# Patient Record
Sex: MALE | Race: WHITE | URBAN - METROPOLITAN AREA
[De-identification: names, ages, dates, MRNs, and addresses within clinical notes are randomized per-mention and may not be internally consistent; named-entity substitution may affect disease eponyms.]

---

## 2019-01-04 ENCOUNTER — OFFICE VISIT (OUTPATIENT)
Dept: URBAN - METROPOLITAN AREA CLINIC 17 | Facility: CLINIC | Age: 84
End: 2019-01-04
Payer: MEDICARE

## 2019-01-04 DIAGNOSIS — H43.813 VITREOUS DEGENERATION, BILATERAL: ICD-10-CM

## 2019-01-04 DIAGNOSIS — H35.373 PUCKERING OF MACULA, BILATERAL: Primary | ICD-10-CM

## 2019-01-04 DIAGNOSIS — H11.153 PINGUECULA, BILATERAL: ICD-10-CM

## 2019-01-04 PROCEDURE — 92014 COMPRE OPH EXAM EST PT 1/>: CPT | Performed by: OPTOMETRIST

## 2019-01-04 PROCEDURE — 92134 CPTRZ OPH DX IMG PST SGM RTA: CPT | Performed by: OPTOMETRIST

## 2019-01-04 ASSESSMENT — INTRAOCULAR PRESSURE
OS: 13
OD: 11

## 2019-01-04 ASSESSMENT — KERATOMETRY
OD: 42.88
OS: 42.38

## 2019-01-04 NOTE — IMPRESSION/PLAN
Impression: Pinguecula, bilateral: H11.153. Plan: Will continue to observe condition and or symptoms.

## 2019-01-04 NOTE — IMPRESSION/PLAN
Impression: Diagnosis: Puckering of macula, bilateral. Code: V74.981. Conditions: established, stable. Vision: vision not affected. Plan: Discussed diagnosis in detail with patient. Exam and OCT shows stable mild ERM OU not affecting the vision. No treatment is required at this time. Will continue to observe condition and or symptoms.

## 2019-01-04 NOTE — IMPRESSION/PLAN
Impression: Nexdtve age-related mclr degn, bilateral, early dry stage: H35.3131. OU. Condition: established, stable. Vision: vision not affected. Plan: Discussed diagnosis in detail with patient. No treatment is required at this time based on exam and OCT. Will reassess condition in 1 yr. OCT shows no IRF or SRF - stable OU. Continue taking the eye vitamins - AREDS 2 formula.

## 2019-02-20 ENCOUNTER — OFFICE VISIT (OUTPATIENT)
Dept: URBAN - METROPOLITAN AREA CLINIC 17 | Facility: CLINIC | Age: 84
End: 2019-02-20
Payer: MEDICARE

## 2019-02-20 PROCEDURE — 92134 CPTRZ OPH DX IMG PST SGM RTA: CPT | Performed by: OPHTHALMOLOGY

## 2019-02-20 PROCEDURE — 92014 COMPRE OPH EXAM EST PT 1/>: CPT | Performed by: OPHTHALMOLOGY

## 2019-02-20 ASSESSMENT — INTRAOCULAR PRESSURE
OD: 11
OS: 9

## 2019-02-20 NOTE — IMPRESSION/PLAN
Impression: Nexdtve age-related mclr degn, bilateral, early dry stage: H35.3131. OU. Condition: stable. Vision: vision not affected. Hx of Kenalog injection OD 07/31/2013 in [place]. Plan: Discussed diagnosis in detail with patient. No treatment is required at this time based on exam and OCT. Recommend observation for now. Recommend to continue eye care with Optometrist. Discussed and recommend eye vitamins - AREDS 2 formula.

## 2019-02-20 NOTE — IMPRESSION/PLAN
Impression: Puckering of macula, bilateral. Code: L91.770. Conditions: established, stable. Vision: vision not affected. Plan: Discussed diagnosis in detail with patient. Exam and OCT shows stable mild ERM OU not affecting the vision. No treatment is required at this time. Will continue to observe condition and or symptoms. Recommend to continue eye care with Optometrist and follow - up in 6 mos w/Dr. Yokasta Wilson or Dr Geraldine Kearney, patient states he will be returning home in [place], recommend to f/u back home. OCT shows RPE changes centrally with no significant ERM OU.

## 2019-10-23 ENCOUNTER — OFFICE VISIT (OUTPATIENT)
Dept: URBAN - METROPOLITAN AREA CLINIC 17 | Facility: CLINIC | Age: 84
End: 2019-10-23
Payer: MEDICARE

## 2019-10-23 DIAGNOSIS — Z96.1 PRESENCE OF INTRAOCULAR LENS: Primary | ICD-10-CM

## 2019-10-23 DIAGNOSIS — H35.3131 NEXDTVE AGE-RELATED MCLR DEGN, BILATERAL, EARLY DRY STAGE: ICD-10-CM

## 2019-10-23 PROCEDURE — 92014 COMPRE OPH EXAM EST PT 1/>: CPT | Performed by: OPTOMETRIST

## 2019-10-23 PROCEDURE — 92134 CPTRZ OPH DX IMG PST SGM RTA: CPT | Performed by: OPTOMETRIST

## 2019-10-23 ASSESSMENT — INTRAOCULAR PRESSURE
OS: 15
OD: 13

## 2019-10-23 NOTE — IMPRESSION/PLAN
Impression: Nexdtve age-related mclr degn, bilateral, early dry stage: H35.3131. OU. Condition: stable. Vision: vision not affected. Hx of Kenalog injection OD 07/31/2013 in [place]. Plan: Discussed diagnosis in detail with patient. No treatment is required at this time based on exam and OCT. Discussed and recommend eye vitamins - AREDS 2 formula.

## 2020-12-11 ENCOUNTER — OFFICE VISIT (OUTPATIENT)
Dept: URBAN - METROPOLITAN AREA CLINIC 17 | Facility: CLINIC | Age: 85
End: 2020-12-11
Payer: MEDICARE

## 2020-12-11 DIAGNOSIS — H35.3110 NONEXUDATIVE AGE-RELATED MACULAR DEGENERATION OF RIGHT EYE: ICD-10-CM

## 2020-12-11 DIAGNOSIS — H35.372 PUCKERING OF MACULA, LEFT EYE: ICD-10-CM

## 2020-12-11 DIAGNOSIS — H35.3220 EXUDATIVE AGE-RELATED MACULAR DEGENERATION OF LEFT EYE: Primary | ICD-10-CM

## 2020-12-11 PROCEDURE — 92134 CPTRZ OPH DX IMG PST SGM RTA: CPT | Performed by: OPTOMETRIST

## 2020-12-11 PROCEDURE — 92014 COMPRE OPH EXAM EST PT 1/>: CPT | Performed by: OPTOMETRIST

## 2020-12-11 ASSESSMENT — INTRAOCULAR PRESSURE
OS: 14
OD: 12

## 2020-12-11 NOTE — IMPRESSION/PLAN
Impression: Exudative age-related macular degeneration of left eye: H35.2526. Plan: Discussed diagnosis in detail with patient. Reviewed MAC OCT. New fluid. Consult recommended [[facility]
Impression: Nonexudative age-related macular degeneration of right eye: H35.3110. Plan: Discussed diagnosis & treatment options in detail with patient. Discussed risks of progression. Use of OTC AREDS2 vitamins has shown to slow the progression of AMD. If any new distortion noted, pt aware to call office immediately. Will continue to monitor with yearly DE and Mac OCT.
Impression: Presence of intraocular lens: Z96.1. Bilateral. Plan: IOL(s) positioned well.  Monitor with yearly dilated eye exam.
Impression: Puckering of macula, left eye: H35.372. Plan: No treatment is required. Will continue to monitor.
Impression: Vitreous degeneration, bilateral: H43.813. Plan: There is no evidence of retinal pathology. Recommend the patient return to office yearly for follow up.
no

## 2020-12-17 ENCOUNTER — OFFICE VISIT (OUTPATIENT)
Dept: URBAN - METROPOLITAN AREA CLINIC 17 | Facility: CLINIC | Age: 85
End: 2020-12-17
Payer: MEDICARE

## 2020-12-17 DIAGNOSIS — H43.812 VITREOUS DEGENERATION, LEFT EYE: ICD-10-CM

## 2020-12-17 PROCEDURE — 92014 COMPRE OPH EXAM EST PT 1/>: CPT | Performed by: OPHTHALMOLOGY

## 2020-12-17 PROCEDURE — 67028 INJECTION EYE DRUG: CPT | Performed by: OPHTHALMOLOGY

## 2020-12-17 PROCEDURE — 92134 CPTRZ OPH DX IMG PST SGM RTA: CPT | Performed by: OPHTHALMOLOGY

## 2020-12-17 ASSESSMENT — INTRAOCULAR PRESSURE
OS: 11
OD: 11

## 2020-12-17 NOTE — IMPRESSION/PLAN
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 Right. Condition: new problem addtl w/u needed. Vision: vision affected. Plan: Discussed diagnosis in detail with patient. Discussed risks of progression. Based on today's exam, diagnostic studies and review of records, recommend Intravitreal Injection Treatment RIGHT EYE with AVASTIN to help reduce the fluid in order to prevent a further reduction in vision. An examination that was significantly and separately identifiable from the procedure was performed today. Discussed the risks and benefits of tx. All questions answered. Patient elects to proceed with recommendation. OCT shows significant Cystic edema and Optos OD shows mild thickening w/heme.

## 2020-12-17 NOTE — IMPRESSION/PLAN
Impression: Vitreous degeneration, left eye: H43.812. Bilateral. Condition: new prob, no addtl w/u needed. Plan: Posterior vitreous detachment accounts for the patient's complaints. There is no evidence of retinal pathology. All signs and risks of retinal detachment and tears were discussed in detail. Patient instructed to call office immediately if any symptoms noted.

## 2020-12-17 NOTE — IMPRESSION/PLAN
Impression: Nexdtve age-rel mclr degn, l eye, adv atrpc with sbfvl invl: H33.6093. Left. Condition: new prob, no addtl w/u needed. Vision: vision affected. Plan: Discussed diagnosis in detail with patient. Exam OS shows no IRF or SRF although Optos OS shows ? subfoveal thickening, no obvious SRN and OCT appears stable OS. No treatment is required at this time. Recommend observation for now. Will reassess condition in 4 - 6 wks.  OCT shows no IRF or SRF - stable

## 2021-01-14 ENCOUNTER — OFFICE VISIT (OUTPATIENT)
Dept: URBAN - METROPOLITAN AREA CLINIC 17 | Facility: CLINIC | Age: 86
End: 2021-01-14
Payer: MEDICARE

## 2021-01-14 PROCEDURE — 92134 CPTRZ OPH DX IMG PST SGM RTA: CPT | Performed by: OPHTHALMOLOGY

## 2021-01-14 PROCEDURE — 67028 INJECTION EYE DRUG: CPT | Performed by: OPHTHALMOLOGY

## 2021-01-14 ASSESSMENT — INTRAOCULAR PRESSURE
OS: 12
OD: 14

## 2021-01-14 NOTE — IMPRESSION/PLAN
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 Right. Condition: improving. Vision: vision affected. s/p AV OD #1  12/17/2020 Plan: Due to Coronavirus COVID-19 pandemic and National Emergency, deferred Slit Lamp examination. Findings are based on OCT and Optos. OCT and Optos OD shows a decrease in fluid. Based on findings recommend to continue with Intravitreal Injection Treatment RIGHT EYE with AVASTIN to further reduce the fluid and prevent a further reduction in vision. Discussed the risks and benefits of tx. All questions answered. Patient elects to proceed with recommendation.

## 2021-01-14 NOTE — IMPRESSION/PLAN
Impression: Nexdtve age-rel mclr degn, l eye, adv atrpc with sbfvl invl: H35.6023. Left. Condition: stable. Vision: vision affected. Plan: Due to Coronavirus COVID-19 pandemic and National Emergency, deferred Slit Lamp examination. Findings are based on OCT and Optos. OCT and Optos shows no IRF or SRF OD. No treatment is require at this time. Recommend a retina follow - up in 4 - 6 wks. Discussed and recommend eye vitamins - AREDS 2, educational material provided.

## 2021-02-11 ENCOUNTER — OFFICE VISIT (OUTPATIENT)
Dept: URBAN - METROPOLITAN AREA CLINIC 17 | Facility: CLINIC | Age: 86
End: 2021-02-11
Payer: MEDICARE

## 2021-02-11 PROCEDURE — 67028 INJECTION EYE DRUG: CPT | Performed by: OPHTHALMOLOGY

## 2021-02-11 PROCEDURE — 92134 CPTRZ OPH DX IMG PST SGM RTA: CPT | Performed by: OPHTHALMOLOGY

## 2021-02-11 ASSESSMENT — INTRAOCULAR PRESSURE
OS: 13
OD: 13

## 2021-02-11 NOTE — IMPRESSION/PLAN
Impression: Nexdtve age-rel mclr degn, l eye, adv atrpc with sbfvl invl: H35.4244. Left. Condition: stable. Vision: vision affected. Plan: Due to Coronavirus COVID-19 pandemic and National Emergency, deferred Slit Lamp examination. Findings are based on OCT and Optos. OCT and Optos shows no IRF or SRF OD. No treatment is require at this time. Recommend a retina follow - up in 4 - 6 wks. Discussed and recommend eye vitamins - AREDS 2, educational material provided.

## 2021-02-11 NOTE — IMPRESSION/PLAN
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 Right. Condition: improving. Vision: vision affected. s/p AV OD 01/14/2021, AV OD #1  12/17/2020 Plan: Due to Coronavirus COVID-19 pandemic and National Emergency, deferred Slit Lamp examination. Findings are based on OCT and Optos. OCT and Optos OD shows a mild fluid. Based on findings recommend to continue with Intravitreal Injection Treatment RIGHT EYE with AVASTIN to further reduce the fluid and prevent a further reduction in vision. Discussed the risks and benefits of tx. All questions answered. Patient elects to proceed with recommendation.

## 2021-03-17 ENCOUNTER — OFFICE VISIT (OUTPATIENT)
Dept: URBAN - METROPOLITAN AREA CLINIC 17 | Facility: CLINIC | Age: 86
End: 2021-03-17
Payer: MEDICARE

## 2021-03-17 PROCEDURE — 67028 INJECTION EYE DRUG: CPT | Performed by: OPHTHALMOLOGY

## 2021-03-17 PROCEDURE — 92134 CPTRZ OPH DX IMG PST SGM RTA: CPT | Performed by: OPHTHALMOLOGY

## 2021-03-17 ASSESSMENT — INTRAOCULAR PRESSURE
OS: 13
OD: 11

## 2021-03-17 NOTE — IMPRESSION/PLAN
Impression: Nexdtve age-rel mclr degn, l eye, adv atrpc with sbfvl invl: H35.0521. Left. Condition: stable. Vision: vision affected. Plan: Due to Coronavirus COVID-19 pandemic and National Emergency, deferred Slit Lamp examination. Findings are based on OCT and Optos. OCT and Optos shows the macula is stable, no IRF or SRF OS. No treatment is required at this time. Recommend a retina follow - up in 4 - 6 wks. Advised patient to continue using AREDS-2 PO BID.

## 2021-03-17 NOTE — IMPRESSION/PLAN
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 Right. Condition: improving. Vision: vision affected. s/p AV OD 2/11/2021, AV OD 01/14/2021, AV OD #1  12/17/2020 Plan: Due to Coronavirus COVID-19 pandemic and National Emergency, deferred Slit Lamp examination. Findings are based on OCT and Optos. OCT OD shows decreased swelling and Optos OD shows mild fluid. Based on findings recommend to continue with Intravitreal Injection Treatment RIGHT EYE with AVASTIN to further reduce the fluid and prevent a further reduction in vision. Discussed the risks and benefits of tx. All questions answered. Patient elects to proceed with recommendation. Patient will be leaving to [facility] on 3/25/21 and will need to find a retina provider in the area for follow-up care. Provided the patient with the information for Dr. Lamberto Quiles in [place], [facility] to establish care.

## 2021-11-18 ENCOUNTER — OFFICE VISIT (OUTPATIENT)
Dept: URBAN - METROPOLITAN AREA CLINIC 17 | Facility: CLINIC | Age: 86
End: 2021-11-18
Payer: MEDICARE

## 2021-11-18 PROCEDURE — 92134 CPTRZ OPH DX IMG PST SGM RTA: CPT | Performed by: OPHTHALMOLOGY

## 2021-11-18 PROCEDURE — 99213 OFFICE O/P EST LOW 20 MIN: CPT | Performed by: OPHTHALMOLOGY

## 2021-11-18 PROCEDURE — 67028 INJECTION EYE DRUG: CPT | Performed by: OPHTHALMOLOGY

## 2021-11-18 ASSESSMENT — INTRAOCULAR PRESSURE
OS: 10
OD: 8

## 2021-11-18 NOTE — IMPRESSION/PLAN
Impression: Nexdtve age-rel mclr degn, l eye, adv atrpc with sbfvl invl: H30.6820. Left. Condition: stable. Vision: vision affected. Plan: Discussed diagnosis in detail with patient. Reviewed records from [place]. Exam shows the macula is stable OS. No treatment is required at this time based on exam and diagnostic test. Recommend observation for now. Will reassess condition in 6 wks. OCT shows the macula is stable, no IRF or SRF OS. Recommend a retina follow - up in 4 - 6 wks. Advised patient to continue using the eye vitamins AREDS-2 formula.

## 2021-11-18 NOTE — IMPRESSION/PLAN
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 Right. Condition: unstable. Vision: vision affected. s/p AV OD 2/11/2021, AV OD 01/14/2021, AV OD #1  12/17/2020
s/p multiple ANIYAH tx's, last tx OD 9/28/2021 in [facility] 119: Discussed diagnosis in detail with patient. Discussed risks of progression. Based on today's exam, diagnostic study and review of records from [place].  recommend to continue with Intravitreal Injection Treatment RIGHT EYE with AVASTIN  to help reduce the fluid in order to prevent a further reduction in vision. Discussed the risks and benefits of tx. All questions answered. Patient elects to proceed with recommendation. OCT OD shows minimal edema / leakage.

## 2021-12-28 ENCOUNTER — OFFICE VISIT (OUTPATIENT)
Dept: URBAN - METROPOLITAN AREA CLINIC 17 | Facility: CLINIC | Age: 86
End: 2021-12-28
Payer: MEDICARE

## 2021-12-28 DIAGNOSIS — H35.3124 NEXDTVE AGE-REL MCLR DEGN, L EYE, ADV ATRPC WITH SBFVL INVL: ICD-10-CM

## 2021-12-28 DIAGNOSIS — H35.3211 EXDTVE AGE-REL MCLR DEGN, RIGHT EYE, WITH ACTV CHRDL NEOVAS: Primary | ICD-10-CM

## 2021-12-28 PROCEDURE — 99213 OFFICE O/P EST LOW 20 MIN: CPT | Performed by: OPHTHALMOLOGY

## 2021-12-28 PROCEDURE — 92134 CPTRZ OPH DX IMG PST SGM RTA: CPT | Performed by: OPHTHALMOLOGY

## 2021-12-28 ASSESSMENT — INTRAOCULAR PRESSURE
OD: 8
OS: 8

## 2021-12-28 NOTE — IMPRESSION/PLAN
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 Right. Condition: improving. Vision: vision affected. s/p AV OD 11/18/2021, AV OD 3/17/2021, AV OD 2/11/2021, AV OD 01/14/2021, AV OD #1  12/17/2020
s/p multiple ANIYAH tx's, last tx OD 9/28/2021 in [address] [place] [address] 289: Discussed diagnosis in detail with patient. Discussed risks of progression. Based on today's exam, diagnostic study and review of records recommend to continue with Intravitreal Injection Treatment RIGHT EYE with AVASTIN  to help reduce the fluid in order to prevent a further reduction in vision. Discussed the risks and benefits of tx. All questions answered. Patient elects to proceed with recommendation. OCT and Optos OD shows minimal fluid.

## 2022-01-19 ENCOUNTER — PROCEDURE (OUTPATIENT)
Dept: URBAN - METROPOLITAN AREA CLINIC 17 | Facility: CLINIC | Age: 87
End: 2022-01-19
Payer: MEDICARE

## 2022-01-19 PROCEDURE — 67028 INJECTION EYE DRUG: CPT | Performed by: OPHTHALMOLOGY

## 2022-03-10 ENCOUNTER — OFFICE VISIT (OUTPATIENT)
Dept: URBAN - METROPOLITAN AREA CLINIC 17 | Facility: CLINIC | Age: 87
End: 2022-03-10
Payer: MEDICARE

## 2022-03-10 PROCEDURE — 67028 INJECTION EYE DRUG: CPT | Performed by: OPHTHALMOLOGY

## 2022-03-10 PROCEDURE — 92134 CPTRZ OPH DX IMG PST SGM RTA: CPT | Performed by: OPHTHALMOLOGY

## 2022-03-10 ASSESSMENT — INTRAOCULAR PRESSURE
OD: 13
OS: 13

## 2022-03-10 NOTE — IMPRESSION/PLAN
Impression: Nexdtve age-rel mclr degn, l eye, adv atrpc with sbfvl invl: H31.1980. Left. Condition: stable. Vision: vision affected. Plan: Discussed diagnosis in detail with patient. Exam shows the macula is stable OS. No treatment is required at this time based on exam and diagnostic test. Recommend observation for now. Patient is returning back home, advise to follow -up in 4 - 6 wks in [place]. Recommend to continue using the eye vitamins AREDS-2 formula. OCT and Optos shows the macula is stable, no IRF or SRF.

## 2022-03-10 NOTE — IMPRESSION/PLAN
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 Right. Condition: improving. Vision: vision affected. s/p AV OD 1/19/22, AV OD 11/18/2021, AV OD 3/17/2021 w/Dr Espino
s/p multiple ANIYAH tx's, last tx OD 9/28/2021 in [address] [place] [address] 289: Discussed diagnosis in detail with patient. Discussed risks of progression. Based on today's exam, diagnostic study and review of records recommend to continue with Intravitreal Injection Treatment RIGHT EYE with AVASTIN  to help reduce the fluid in order to prevent a further reduction in vision. Discussed the risks and benefits of tx. All questions answered. Patient elects to proceed with recommendation. OCT OD shows a moderate decrease in CMT and Optos OD shows decrease in heme.

## 2022-10-20 ENCOUNTER — OFFICE VISIT (OUTPATIENT)
Dept: URBAN - METROPOLITAN AREA CLINIC 17 | Facility: CLINIC | Age: 87
End: 2022-10-20
Payer: MEDICARE

## 2022-10-20 DIAGNOSIS — H35.3211 EXDTVE AGE-REL MCLR DEGN, RIGHT EYE, WITH ACTV CHRDL NEOVAS: Primary | ICD-10-CM

## 2022-10-20 DIAGNOSIS — H35.3124 NEXDTVE AGE-REL MCLR DEGN, L EYE, ADV ATRPC WITH SBFVL INVL: ICD-10-CM

## 2022-10-20 PROCEDURE — 92134 CPTRZ OPH DX IMG PST SGM RTA: CPT | Performed by: OPHTHALMOLOGY

## 2022-10-20 PROCEDURE — 99213 OFFICE O/P EST LOW 20 MIN: CPT | Performed by: OPHTHALMOLOGY

## 2022-10-20 ASSESSMENT — INTRAOCULAR PRESSURE
OD: 8
OS: 9

## 2022-10-20 NOTE — IMPRESSION/PLAN
Impression: Nexdtve age-rel mclr degn, l eye, adv atrpc with sbfvl invl: H30.9995. Left. Condition: stable. Vision: vision affected. Plan: Discussed diagnosis in detail with patient. Discussed risks of progression with present condition. Exam shows the macula is stable. No treatment is needed at this time. Recommend observation for now. Continue taking the eye vitamins - AREDS 2 formula. OCT and Optos OS shows stable. Will reassess in 4 weeks.

## 2022-10-20 NOTE — IMPRESSION/PLAN
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 Right. Condition: improving. Vision: vision affected. s/p AV OD 03/10/22, AV OD 01/19/22, AV OD 11/18/21. ..w/Dr Yenni Flores
s/p multiple ANIYAH tx's, last tx OD 08/17/22 w/ Dr. Brandon Kidd: Discussed diagnosis in detail with patient. No treatment is required at this time based on exam and diagnostic tests. Recommend observation for now. Will reassess condition in 4 wks. OCT and Optos OD shows decreased swelling.  OK to proceed w/ refraction with Optometrist.

## 2022-11-17 ENCOUNTER — OFFICE VISIT (OUTPATIENT)
Dept: URBAN - METROPOLITAN AREA CLINIC 17 | Facility: CLINIC | Age: 87
End: 2022-11-17
Payer: MEDICARE

## 2022-11-17 DIAGNOSIS — H35.3211 EXDTVE AGE-REL MCLR DEGN, RIGHT EYE, WITH ACTV CHRDL NEOVAS: Primary | ICD-10-CM

## 2022-11-17 DIAGNOSIS — H35.3124 NEXDTVE AGE-REL MCLR DEGN, L EYE, ADV ATRPC WITH SBFVL INVL: ICD-10-CM

## 2022-11-17 PROCEDURE — 92134 CPTRZ OPH DX IMG PST SGM RTA: CPT | Performed by: OPHTHALMOLOGY

## 2022-11-17 PROCEDURE — 99213 OFFICE O/P EST LOW 20 MIN: CPT | Performed by: OPHTHALMOLOGY

## 2022-11-17 ASSESSMENT — INTRAOCULAR PRESSURE
OD: 9
OS: 10

## 2022-11-17 NOTE — IMPRESSION/PLAN
Impression: Nexdtve age-rel mclr degn, l eye, adv atrpc with sbfvl invl: H36.7873. Left. Condition: stable. Vision: vision affected. Plan: Discussed diagnosis in detail with patient. Discussed risks of progression with present condition. Exam shows the macula is stable. No treatment is needed at this time. Recommend observation for now. Continue taking the eye vitamins - AREDS 2 formula. OCT and Optos OS shows stable. Will reassess in 8-10 weeks.

## 2022-11-17 NOTE — IMPRESSION/PLAN
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 Right. Condition: stable. Vision: vision affected. s/p AV OD 03/10/22, AV OD 01/19/22, AV OD 11/18/21. ..w/Dr Christiano Montgomery
s/p multiple ANIYAH tx's, last tx OD 08/17/22 w/ Dr. Aggarwal Pitcher: Discussed diagnosis in detail with patient. No treatment is required at this time based on exam and diagnostic tests. Recommend observation for now. Will reassess condition in 8-10 wks. OCT and Optos OD shows stable appearing, no active leakage.  OK to proceed w/ refraction with Optometrist.

## 2022-12-15 ENCOUNTER — TESTING ONLY (OUTPATIENT)
Dept: URBAN - METROPOLITAN AREA CLINIC 17 | Facility: CLINIC | Age: 87
End: 2022-12-15

## 2022-12-15 DIAGNOSIS — H52.13 MYOPIA, BILATERAL: Primary | ICD-10-CM

## 2022-12-15 ASSESSMENT — VISUAL ACUITY
OS: 20/50
OD: 20/25

## 2023-01-24 ENCOUNTER — OFFICE VISIT (OUTPATIENT)
Dept: URBAN - METROPOLITAN AREA CLINIC 17 | Facility: CLINIC | Age: 88
End: 2023-01-24
Payer: MEDICARE

## 2023-01-24 DIAGNOSIS — H35.3212 EXDTVE AGE-REL MCLR DEGN, RIGHT EYE, WITH INACT CHRDL NEOVAS: Primary | ICD-10-CM

## 2023-01-24 DIAGNOSIS — H35.3124 NEXDTVE AGE-REL MCLR DEGN, L EYE, ADV ATRPC WITH SBFVL INVL: ICD-10-CM

## 2023-01-24 PROCEDURE — 99213 OFFICE O/P EST LOW 20 MIN: CPT | Performed by: OPHTHALMOLOGY

## 2023-01-24 PROCEDURE — 92134 CPTRZ OPH DX IMG PST SGM RTA: CPT | Performed by: OPHTHALMOLOGY

## 2023-01-24 ASSESSMENT — INTRAOCULAR PRESSURE
OD: 13
OS: 13

## 2023-01-24 NOTE — IMPRESSION/PLAN
Impression: Nexdtve age-rel mclr degn, l eye, adv atrpc with sbfvl invl: H39.2724. Left. Condition: stable. Vision: vision affected. Plan: Discussed diagnosis in detail with patient. Discussed risks of progression with present condition. Exam shows the macula is stable. No treatment is needed at this time. Recommend observation for now. Continue taking the eye vitamins - AREDS 2 formula. OCT and Optos OS shows stable.

## 2023-01-24 NOTE — IMPRESSION/PLAN
Impression: Exdtve age-rel mclr degn, right eye, with inact chrdl neovas: H35.4022. Right. Condition: established, stable. Vision: vision affected. s/p AV OD 03/10/22, AV OD 01/19/22, AV OD 11/18/21. ..w/Dr Dat Gordon
s/p multiple ANIYAH tx's, last tx OD 08/17/22 w/ Dr. Mirna Farias: Discussed diagnosis in detail with patient. No treatment is required at this time based on exam and diagnostic tests. Recommend observation for now. OCT shows drusen, no active leakage and Optos shows stable appearing.   Recommend a follow up in 6 months, patient states that he leaves for the summer, recommend pt to follow up in the fall when he returns, and to call for a sooner appointment if vision worsens

## 2023-11-16 ENCOUNTER — Encounter (OUTPATIENT)
Dept: URBAN - METROPOLITAN AREA CLINIC 17 | Facility: CLINIC | Age: 88
End: 2023-11-16
Payer: MEDICARE

## 2023-11-16 PROCEDURE — 99213 OFFICE O/P EST LOW 20 MIN: CPT | Performed by: SPECIALIST

## 2023-11-16 PROCEDURE — 92134 CPTRZ OPH DX IMG PST SGM RTA: CPT | Performed by: SPECIALIST

## 2024-02-26 ENCOUNTER — OFFICE VISIT (OUTPATIENT)
Dept: URBAN - METROPOLITAN AREA CLINIC 17 | Facility: CLINIC | Age: 89
End: 2024-02-26
Payer: MEDICARE

## 2024-02-26 DIAGNOSIS — H35.3212 EXUDATIVE AGE-RELATED MACULAR DEGENERATION, RIGHT EYE, WITH INACTIVE CHOROIDAL NEOVASCULARIZATION: Primary | ICD-10-CM

## 2024-02-26 PROCEDURE — 67028 INJECTION EYE DRUG: CPT | Performed by: SPECIALIST

## 2024-02-26 ASSESSMENT — INTRAOCULAR PRESSURE
OD: 15
OS: 15